# Patient Record
Sex: MALE
[De-identification: names, ages, dates, MRNs, and addresses within clinical notes are randomized per-mention and may not be internally consistent; named-entity substitution may affect disease eponyms.]

---

## 2018-05-24 ENCOUNTER — HOSPITAL ENCOUNTER (EMERGENCY)
Dept: HOSPITAL 97 - ER | Age: 68
Discharge: HOME | End: 2018-05-24
Payer: COMMERCIAL

## 2018-05-24 VITALS — DIASTOLIC BLOOD PRESSURE: 77 MMHG | SYSTOLIC BLOOD PRESSURE: 181 MMHG

## 2018-05-24 VITALS — TEMPERATURE: 97.6 F | OXYGEN SATURATION: 100 %
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
## 2018-05-24 DIAGNOSIS — L03.011: ICD-10-CM
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
## 2018-05-24 DIAGNOSIS — S61.431A: Primary | ICD-10-CM

## 2018-05-24 PROCEDURE — 90714 TD VACC NO PRESV 7 YRS+ IM: CPT

## 2018-05-24 PROCEDURE — 0HCFXZZ EXTIRPATION OF MATTER FROM RIGHT HAND SKIN, EXTERNAL APPROACH: ICD-10-PCS

## 2018-05-24 PROCEDURE — 99284 EMERGENCY DEPT VISIT MOD MDM: CPT

## 2018-05-24 NOTE — ER
Nurse's Notes                                                                                     

 [facility]                                                                

Name: Juliet Dangelo                                                                         

Age: 67 yrs                                                                                       

Sex: Male                                                                                         

: 1950                                                                                   

MRN: [id number]                                                                                   

Arrival Date: 2018                                                                          

Time: 13:14                                                                                       

Account#: [id number]                                                                            

Bed Treatment                                                                                     

Private MD: Clay León E                                                                     

Diagnosis: Puncture wound without foreign body of right hand-splinter to right hand               

  rremoved;Cellulitis of right finger-thumb                                                       

                                                                                                  

Presentation:                                                                                     

                                                                                             

13:18 Presenting complaint: Patient states: Infection in right thumb for 2 days. Patient      aj  

      reports splinter removed yesterday, pain and swelling persists. Transition of care:         

      patient was not received from another setting of care. Onset of symptoms was May 23,        

      2018. Care prior to arrival: None.                                                          

13:18 Method Of Arrival: Ambulatory                                                           aj  

13:18 Acuity: TEQUILA 4                                                                           aj  

16:01 Risk Assessment: Do you want to hurt yourself or someone else? Patient reports no       sv  

      desire to harm self or others. Initial Sepsis Screen: Does the patient meet any 2           

      criteria? No. Patient's initial sepsis screen is negative. Does the patient have a          

      suspected source of infection? Yes: Skin breakdown/wound.                                   

                                                                                                  

Triage Assessment:                                                                                

13:19 General: Appears in no apparent distress. comfortable, Behavior is calm, cooperative,   aj  

      appropriate for age. Pain: Complains of pain in dorsal aspect of distal phalanx of          

      right thumb and palmar aspect of distal phalanx of right thumb. Neuro: Level of             

      Consciousness is awake, alert, obeys commands, Oriented to person, place, time,             

      situation, Appropriate for age. Respiratory: Airway is patent Respiratory effort is         

      even, unlabored, Respiratory pattern is regular, symmetrical. Derm: Skin is intact, is      

      healthy with good turgor, Skin is pink, warm \T\ dry. normal. Musculoskeletal: Swelling     

      present in dorsal aspect of distal phalanx of right thumb and palmar aspect of distal       

      phalanx of right thumb.                                                                     

                                                                                                  

Historical:                                                                                       

- Allergies:                                                                                      

13:19 No Known Allergies;                                                                     aj  

- Home Meds:                                                                                      

13:19 aspirin 81 mg Oral TbEC 1 tab once daily [Active]; atorvastatin 20 mg Oral tab 1 tab    aj  

      once daily [Active]; baclofen 10 mg Oral tab 1 tab 3 times per day [Active]; lisinopril     

      20 mg Oral tab 1 tab once daily [Active]; metformin 500 mg Oral tab 1 tab 2 times per       

      day [Active];                                                                               

- PMHx:                                                                                           

13:19 CVA; Diabetes - NIDDM; Hyperlipidemia; Hypertension;                                    aj  

- PSHx:                                                                                           

13:19 Cholecystectomy;                                                                        aj  

                                                                                                  

- Immunization history:: Last tetanus immunization: < 5 years ago.                                

- Social history:: Smoking status: Patient/guardian denies using tobacco.                         

- Ebola Screening: : No symptoms or risks identified at this time.                                

                                                                                                  

                                                                                                  

Screenin:00 Abuse screen: Denies threats or abuse. Denies injuries from another. Nutritional        sv  

      screening: No deficits noted. Tuberculosis screening: No symptoms or risk factors           

      identified. Fall Risk None identified.                                                      

                                                                                                  

Assessment:                                                                                       

15:56 General: Appears in no apparent distress. uncomfortable, slender, Behavior is calm,     sv  

      cooperative, appropriate for age. Pain: Complains of pain in right thumb Pain currently     

      is 7 out of 10 on a pain scale. Quality of pain is described as tender, throbbing, Pain     

      began 2-3 days ago. Is continuous. Neuro: Level of Consciousness is awake, alert, obeys     

      commands, Oriented to person, place, time, situation, Moves all extremities. Full           

      function Gait is steady. Cardiovascular: Patient's skin is warm and dry. Pulses are 3+      

      in right radial artery and left radial artery. Respiratory: Respiratory effort is even,     

      unlabored, Respiratory pattern is regular, symmetrical. Derm: Skin is normal.               

      Musculoskeletal: Range of motion: intact in all extremities, Swelling present in right      

      thumb Pt reports that he had a splinter lodged in his skin and underneath the nail for      

      about a day then took it out and the swelling started. Pt went to Dr Stroud's office but      

      was then referred here for further evaluation.                                              

16:44 Reassessment: Patient appears in no apparent distress at this time. No changes from     sv  

      previously documented assessment. Patient and/or family updated on plan of care and         

      expected duration. Pain level reassessed. Patient is alert, oriented x 3, equal             

      unlabored respirations, skin warm/dry/pink.                                                 

18:52 Reassessment: Patient appears in no apparent distress at this time. Patient and/or      sv  

      family updated on plan of care and expected duration. Pain level reassessed. Patient is     

      alert, oriented x 3, equal unlabored respirations, skin warm/dry/pink. Patient denies       

      pain at this time.                                                                          

                                                                                                  

Vital Signs:                                                                                      

13:19  / 79; Pulse 68; Resp 19; Temp 97.6; Pulse Ox 100% on R/A; Weight 68.04 kg;       aj  

      Height 5 ft. 4 in. (162.56 cm);                                                             

16:44  / 77; Pulse 57; Resp 18; Pulse Ox 100% ;                                         sv  

13:19 Body Mass Index 25.75 (68.04 kg, 162.56 cm)                                             aj  

                                                                                                  

ED Course:                                                                                        

13:14 Patient arrived in ED.                                                                  mr  

13:15 Clay León MD is Private Physician.                                                mr  

13:19 Triage completed.                                                                       aj  

13:19 Arm band placed on left wrist. Patient placed in waiting room, Patient notified of wait aj  

      time.                                                                                       

15:56 Rae Babin, RN is Primary Nurse.                                                  sv  

16:00 Patient has correct armband on for positive identification. Adult w/ patient. Door      sv  

      closed.                                                                                     

16:12 Noble Barba NP is PHCP.                                                           pm1 

16:13 Jose Khalil MD is Attending Physician.                                              pm1 

16:32 X-ray(s) taken.                                                                         sv  

16:44 X-ray completed. Portable x-ray completed in exam room. Patient tolerated procedure     bb2 

      well.                                                                                       

16:49 Hand Right 3 View XRAY In Process Unspecified.                                          EDMS

18:26 Tavo Winston MD is Referral Physician.                                              pm1 

18:30 Assist provider with I \T\ D: of an abscess on right thumb Set up I\T\D tray. Performed by  cecilia Barba NP Dressing with Neosporin and bandaid.                                     

18:52 Patient did not have IV access during this emergency room visit.                        sv  

                                                                                                  

Administered Medications:                                                                         

16:30 Drug: Tetanus-Diphtheria Toxoid Adult 0.5 ml {: UpTo. Exp:         sv  

      2020. Lot #: [id number]. } Route: IM; Site: left deltoid;                                  

17:35 Follow up: Response: No adverse reaction                                                sv  

17:35 Drug: Lidocaine (1 %) 5 ml {Note: given to Noble MCKINNON for procedure.} Volume: 5 ml;     sv  

      Route: Infiltration;                                                                        

                                                                                                  

                                                                                                  

Outcome:                                                                                          

18:27 Discharge ordered by MD.                                                                pm1 

18:52 Discharged to home ambulatory, with family.                                             sv  

18:52 Condition: stable                                                                           

18:52 Discharge instructions given to patient, family, Instructed on discharge instructions,      

      follow up and referral plans. medication usage, wound care, Demonstrated understanding      

      of instructions, follow-up care, medications, wound care, Prescriptions given X 2.          

18:53 Patient left the ED.                                                                    sv  

                                                                                                  

Signatures:                                                                                       

Dispatcher [employer]                           EDRae Torres RN RN sv Myers, Amanda, RN RN aj Rivera, Maria mr Marinas, Patrick, NP                    NP   pm1                                                  

Roe, Nat                               bb2                                                  

                                                                                                  

**************************************************************************************************

## 2018-05-24 NOTE — EDPHYS
Physician Documentation                                                                           

 [facility]                                                                

Name: Juliet Dangelo                                                                         

Age: 67 yrs                                                                                       

Sex: Male                                                                                         

: 1950                                                                                   

MRN: [id number]                                                                                   

Arrival Date: 2018                                                                          

Time: 13:14                                                                                       

Account#: [id number]                                                                            

Bed Treatment                                                                                     

Private MD: Clay León E                                                                     

ED Physician Jose Khalil                                                                       

HPI:                                                                                              

                                                                                             

18:00 This 67 yrs old  Male presents to ER via Ambulatory with complaints of Thumb    pm1 

      Injury.                                                                                     

18:00 The patient or guardian reports pain, swelling. The complaints affect the right thumb.  pm1 

      Context: The problem was sustained at home, resulted from puncture wound 2 days ago.        

      Onset: The symptoms/episode began/occurred 2 day(s) ago. Modifying factors: The             

      symptoms are alleviated by nothing, the symptoms are aggravated by nothing. Associated      

      signs and symptoms: Pertinent negatives: numbness distally, tingling distally. Severity     

      of symptoms: in the emergency department the symptoms are actually worse. The patient       

      has not experienced similar symptoms in the past. The patient has not recently seen a       

      physician. patient was changing a curtain chong and got a splinter to his right thumb 2       

      days ago. Patient removed the splinter yesterday. patient without fever. Full range of      

      motion present to thumb.                                                                    

                                                                                                  

Historical:                                                                                       

- Allergies:                                                                                      

13:19 No Known Allergies;                                                                     aj  

- Home Meds:                                                                                      

13:19 aspirin 81 mg Oral TbEC 1 tab once daily [Active]; atorvastatin 20 mg Oral tab 1 tab    aj  

      once daily [Active]; baclofen 10 mg Oral tab 1 tab 3 times per day [Active]; lisinopril     

      20 mg Oral tab 1 tab once daily [Active]; metformin 500 mg Oral tab 1 tab 2 times per       

      day [Active];                                                                               

- PMHx:                                                                                           

13:19 CVA; Diabetes - NIDDM; Hyperlipidemia; Hypertension;                                    aj  

- PSHx:                                                                                           

13:19 Cholecystectomy;                                                                        aj  

                                                                                                  

- Immunization history:: Last tetanus immunization: < 5 years ago.                                

- Social history:: Smoking status: Patient/guardian denies using tobacco.                         

- Ebola Screening: : No symptoms or risks identified at this time.                                

                                                                                                  

                                                                                                  

ROS:                                                                                              

18:00 Constitutional: Negative for fever, chills, and weight loss, Eyes: Negative for injury, pm1 

      pain, redness, and discharge, ENT: Negative for injury, pain, and discharge, Neck:          

      Negative for injury, pain, and swelling, Cardiovascular: Negative for chest pain,           

      palpitations, and edema, Respiratory: Negative for shortness of breath, cough,              

      wheezing, and pleuritic chest pain, Abdomen/GI: Negative for abdominal pain, nausea,        

      vomiting, diarrhea, and constipation, Back: Negative for injury and pain.                   

18:00 Skin: Negative for injury, rash, and discoloration, Neuro: Negative for headache,           

      weakness, numbness, tingling, and seizure.                                                  

18:00 MS/extremity: Positive for pain, swelling, of the right thumb, Negative for decreased       

      range of motion, deformity.                                                                 

                                                                                                  

Exam:                                                                                             

18:00 Constitutional:  This is a well developed, well nourished patient who is awake, alert,  pm1 

      and in no acute distress. Head/Face:  Normocephalic, atraumatic. Neck:  Trachea             

      midline, no thyromegaly or masses palpated, and no cervical lymphadenopathy.  Supple,       

      full range of motion without nuchal rigidity, or vertebral point tenderness.  No            

      Meningismus. Chest/axilla:  Normal chest wall appearance and motion.  Nontender with no     

      deformity.  No lesions are appreciated. Cardiovascular:  Regular rate and rhythm with a     

      normal S1 and S2.  No gallops, murmurs, or rubs.  Normal PMI, no JVD.  No pulse             

      deficits. Respiratory:  Lungs have equal breath sounds bilaterally, clear to                

      auscultation and percussion.  No rales, rhonchi or wheezes noted.  No increased work of     

      breathing, no retractions or nasal flaring. Back:  No spinal tenderness.  No                

      costovertebral tenderness.  Full range of motion.                                           

18:00 MS/ Extremity:  Pulses equal, no cyanosis.  Neurovascular intact.  Full, normal range       

      of motion.  No fusiform swelling, tenderness over tendon sheath, flexed position of         

      finger, or pain with passive flexion of right thumb.  Negative kanavel's signs              

18:00 Skin: cellulitis, that is minimal, on the  dorsal aspect of distal phalanx of right         

      thumb.                                                                                      

                                                                                                  

Vital Signs:                                                                                      

13:19  / 79; Pulse 68; Resp 19; Temp 97.6; Pulse Ox 100% on R/A; Weight 68.04 kg;       aj  

      Height 5 ft. 4 in. (162.56 cm);                                                             

16:44  / 77; Pulse 57; Resp 18; Pulse Ox 100% ;                                         sv  

13:19 Body Mass Index 25.75 (68.04 kg, 162.56 cm)                                               

                                                                                                  

Procedures:                                                                                       

18:00 I \T\ D: Incision and drainage was performed for an abscess of the right Prepped with     pm1

      Betadine, Anesthetized with 2 ml's 1% Lidocaine. digital block to right thumb. Incised      

      with Drained No purulence or drainage present with incision with scalpel #11 to main        

      area of swelling, lateral aspect of right thumb nail, and lifting cuticle. No abscess       

      present the patient tolerated the procedure well.                                           

                                                                                                  

MDM:                                                                                              

16:13 Patient medically screened.                                                             pm1 

17:37 Data reviewed: vital signs. Data interpreted: Pulse oximetry: on room air is 100 %.     pm1 

      Interpretation: normal.                                                                     

18:25 Counseling: I had a detailed discussion with the patient and/or guardian regarding: the pm1 

      historical points, exam findings, and any diagnostic results supporting the                 

      discharge/admit diagnosis, radiology results, the need for outpatient follow up, a hand     

      specialist, to return to the emergency department if symptoms worsen or persist or if       

      there are any questions or concerns that arise at home.                                     

                                                                                                  

                                                                                             

16:16 Order name: Hand Right 3 View XRAY; Complete Time: 17:17                                pm1 

                                                                                                  

Administered Medications:                                                                         

16:30 Drug: Tetanus-Diphtheria Toxoid Adult 0.5 ml {: [email or web address]. Exp:         sv  

      2020. Lot #: [id number]. } Route: IM; Site: left deltoid;                                  

17:35 Follow up: Response: No adverse reaction                                                sv  

17:35 Drug: Lidocaine (1 %) 5 ml {Note: given to Noble MCKINNON for procedure.} Volume: 5 ml;     sv  

      Route: Infiltration;                                                                        

                                                                                                  

                                                                                                  

Disposition:                                                                                      

19:21 Co-signature as Attending Physician, Jose Khalil MD I agree with the assessment and   kdr 

      plan of care.                                                                               

                                                                                                  

Disposition:                                                                                      

18 18:27 Discharged to Home. Impression: Puncture wound without foreign body of right       

  hand - splinter to right hand rremoved, Cellulitis of right finger - thumb.                     

- Condition is Stable.                                                                            

- Discharge Instructions: Puncture Wound.                                                         

- Prescriptions for Bactrim - 160 mg Oral Tablet - take 1 tablet by ORAL route              

  every 12 hours for 10 days; 20 tablet. Keflex 500 mg Oral Capsule - take 1 capsule by           

  ORAL route every 12 hours for 10 days; 20 capsule.                                              

- Medication Reconciliation Form, Thank You Letter, Antibiotic Education form.                    

- Follow up: Tavo Winston MD; When: 2 - 3 days; Reason: Recheck today's complaints,           

  Continuance of care, Re-evaluation by your physician.                                           

- Problem is new.                                                                                 

- Symptoms have improved.                                                                         

                                                                                                  

                                                                                                  

                                                                                                  

Signatures:                                                                                       

Dispatcher [employer]                           EDRae Torres RN RN sv Myers, Amanda, RN RN aj Rittger, Kevin, MD MD kdr Marinas, Patrick, NP                    NP   pm1                                                  

                                                                                                  

Corrections: (The following items were deleted from the chart)                                    

18:29 18:27 2018 18:27 Discharged to Home. Impression: Puncture wound without foreign   pm1 

      body of right hand - splinter to right hand rremoved. Condition is Stable. Forms are        

      Medication Reconciliation Form, Thank You Letter, Antibiotic Education, Prescription        

      Opioid Use. Follow up: Tavo Winston; When: 2 - 3 days; Reason: Recheck today's            

      complaints, Continuance of care, Re-evaluation by your physician. Problem is new.           

      Symptoms have improved. pm1                                                                 

18:53 18:29 2018 18:27 Discharged to Home. Impression: Puncture wound without foreign   sv  

      body of right hand - splinter to right hand rremoved; Cellulitis of right finger -          

      thumb. Condition is Stable. Discharge Instructions: Puncture Wound. Prescriptions for       

      Bactrim -160 mg Oral Tablet - take 1 tablet by ORAL route every 12 hours for 10       

      days; 20 tablet, Keflex 500 mg Oral Capsule - take 1 capsule by ORAL route every 12         

      hours for 10 days; 20 capsule. and Forms are Medication Reconciliation Form, Thank You      

      Letter, Antibiotic Education. Follow up: Tavo Winston; When: 2 - 3 days; Reason:          

      Recheck today's complaints, Continuance of care, Re-evaluation by your physician.           

      Problem is new. Symptoms have improved. pm1                                                 

                                                                                                  

**************************************************************************************************

## 2018-05-24 NOTE — RAD REPORT
EXAM DESCRIPTION:  RAD - Hand Right 3 View - 5/24/2018 4:52 pm

 

CLINICAL HISTORY:  First digit infection.

 

COMPARISON:  None.

 

FINDINGS:  Moderate soft tissue swelling is present involving the first digit. No fracture, dislocati
on or evidence of subcutaneous air. Radiocarpal arthritic changes are present. Vascular calcification
s are seen.

 

IMPRESSION:  Moderate soft tissue swelling of the first digit.

## 2019-04-06 ENCOUNTER — HOSPITAL ENCOUNTER (EMERGENCY)
Dept: HOSPITAL 97 - ER | Age: 69
Discharge: HOME | End: 2019-04-06
Payer: COMMERCIAL

## 2019-04-06 VITALS — TEMPERATURE: 100.3 F

## 2019-04-06 VITALS — OXYGEN SATURATION: 99 % | DIASTOLIC BLOOD PRESSURE: 82 MMHG | SYSTOLIC BLOOD PRESSURE: 143 MMHG

## 2019-04-06 DIAGNOSIS — Z79.82: ICD-10-CM

## 2019-04-06 DIAGNOSIS — I10: ICD-10-CM

## 2019-04-06 DIAGNOSIS — E78.5: ICD-10-CM

## 2019-04-06 DIAGNOSIS — E11.9: ICD-10-CM

## 2019-04-06 DIAGNOSIS — R50.9: ICD-10-CM

## 2019-04-06 DIAGNOSIS — R09.02: ICD-10-CM

## 2019-04-06 DIAGNOSIS — D72.829: ICD-10-CM

## 2019-04-06 DIAGNOSIS — J15.8: Primary | ICD-10-CM

## 2019-04-06 LAB
ALBUMIN SERPL BCP-MCNC: 4.2 G/DL (ref 3.4–5)
ALP SERPL-CCNC: 110 U/L (ref 45–117)
ALT SERPL W P-5'-P-CCNC: 22 U/L (ref 12–78)
AST SERPL W P-5'-P-CCNC: 23 U/L (ref 15–37)
BUN BLD-MCNC: 14 MG/DL (ref 7–18)
BURR CELLS BLD QL SMEAR: (no result)
CKMB CREATINE KINASE MB: 1.4 NG/ML (ref 0.3–3.6)
GLUCOSE SERPLBLD-MCNC: 182 MG/DL (ref 74–106)
HCT VFR BLD CALC: 37.7 % (ref 39.6–49)
INR BLD: 1.08
LIPASE SERPL-CCNC: 95 U/L (ref 73–393)
LYMPHOCYTES # SPEC AUTO: 1 K/UL (ref 0.7–4.9)
MORPHOLOGY BLD-IMP: (no result)
PMV BLD: 8.5 FL (ref 7.6–11.3)
POTASSIUM SERPL-SCNC: 4 MMOL/L (ref 3.5–5.1)
RBC # BLD: 4.1 M/UL (ref 4.33–5.43)
TROPONIN (EMERG DEPT USE ONLY): < 0.02 NG/ML (ref 0–0.04)

## 2019-04-06 PROCEDURE — 82550 ASSAY OF CK (CPK): CPT

## 2019-04-06 PROCEDURE — 96366 THER/PROPH/DIAG IV INF ADDON: CPT

## 2019-04-06 PROCEDURE — 96368 THER/DIAG CONCURRENT INF: CPT

## 2019-04-06 PROCEDURE — 80076 HEPATIC FUNCTION PANEL: CPT

## 2019-04-06 PROCEDURE — 84484 ASSAY OF TROPONIN QUANT: CPT

## 2019-04-06 PROCEDURE — 36415 COLL VENOUS BLD VENIPUNCTURE: CPT

## 2019-04-06 PROCEDURE — 85025 COMPLETE CBC W/AUTO DIFF WBC: CPT

## 2019-04-06 PROCEDURE — 99285 EMERGENCY DEPT VISIT HI MDM: CPT

## 2019-04-06 PROCEDURE — 83605 ASSAY OF LACTIC ACID: CPT

## 2019-04-06 PROCEDURE — 84145 PROCALCITONIN (PCT): CPT

## 2019-04-06 PROCEDURE — 87804 INFLUENZA ASSAY W/OPTIC: CPT

## 2019-04-06 PROCEDURE — 80048 BASIC METABOLIC PNL TOTAL CA: CPT

## 2019-04-06 PROCEDURE — 85610 PROTHROMBIN TIME: CPT

## 2019-04-06 PROCEDURE — 85730 THROMBOPLASTIN TIME PARTIAL: CPT

## 2019-04-06 PROCEDURE — 93005 ELECTROCARDIOGRAM TRACING: CPT

## 2019-04-06 PROCEDURE — 82553 CREATINE MB FRACTION: CPT

## 2019-04-06 PROCEDURE — 87040 BLOOD CULTURE FOR BACTERIA: CPT

## 2019-04-06 PROCEDURE — 96375 TX/PRO/DX INJ NEW DRUG ADDON: CPT

## 2019-04-06 PROCEDURE — 71275 CT ANGIOGRAPHY CHEST: CPT

## 2019-04-06 PROCEDURE — 83690 ASSAY OF LIPASE: CPT

## 2019-04-06 PROCEDURE — 96365 THER/PROPH/DIAG IV INF INIT: CPT

## 2019-04-06 PROCEDURE — 71045 X-RAY EXAM CHEST 1 VIEW: CPT

## 2019-04-06 NOTE — RAD REPORT
EXAM DESCRIPTION:  RAD - Chest Single View - 4/6/2019 8:42 am

 

CLINICAL HISTORY:  Cough

 

COMPARISON:  July 2017

 

TECHNIQUE:  AP portable chest image was obtained 0835 hour .

 

FINDINGS:  Lung volumes are low. No acute lung parenchymal process. Lung markings are similar to comp
arison. Heart and vasculature are normal. No measurable pleural effusion and no pneumothorax. No acut
e bony abnormality seen. No acute aortic findings suspected.

 

IMPRESSION:  No acute cardiopulmonary process.

 

 No suspicious interval change.

## 2019-04-06 NOTE — XMS REPORT
Patient Summary Document

 Created on:2019



Patient:BRENDA MAYNARD

Sex:Male

:1950

External Reference #:[id number]





Demographics







 Address  [address] [place]



   [address] 



   [place] [address]

 

 Home Phone  [phone]

 

 Work Phone  [phone]

 

 Email Address  [email or web address]

 

 Preferred Language  Unknown

 

 Marital Status  Unknown

 

 Muslim Affiliation  Unknown

 

 Race  Unknown

 

 Additional Race(s)  Unavailable

 

 Ethnic Group  Unknown









Author







 Organization  [facility]

 

 Address  [address] [place] [address] [address]



   [place] [address]

 

 Phone  [phone]









Care Team Providers







 Name  Role  Phone

 

 Unavailable  Unavailable  Unavailable









Problems

This patient has no known problems.



Allergies, Adverse Reactions, Alerts

This patient has no known allergies or adverse reactions.



Medications

This patient has no known medications.

## 2019-04-06 NOTE — EDPHYS
Physician Documentation                                                                           

 [facility]                                                                 

Name: Juliet Dangelo                                                                         

Age: 68 yrs                                                                                       

Sex: Male                                                                                         

: 1950                                                                                   

MRN: [id number]                                                                                   

Arrival Date: 2019                                                                          

Time: 08:00                                                                                       

Account#: [id number]                                                                            

Bed 13                                                                                            

Private MD:                                                                                       

ED Physician Allan Lama                                                                      

HPI:                                                                                              

                                                                                             

08:31 This 68 yrs old  Male presents to ER via Ambulatory with complaints of Cough.   coral 

08:31 The patient or guardian reports airway noise, cough, difficulty breathing. Onset: The   coral 

      symptoms/episode began/occurred 3 day(s) ago. Severity of symptoms: At their worst the      

      symptoms were mild, in the emergency department the symptoms are unchanged. Modifying       

      factors: The symptoms are alleviated by nothing. Associated signs and symptoms:             

      Pertinent positives: fever. The patient has experienced similar episodes in the past, a     

      few times.                                                                                  

                                                                                                  

Historical:                                                                                       

- Allergies:                                                                                      

08:13 No Known Allergies;                                                                     hb  

- Home Meds:                                                                                      

08:13 aspirin 81 mg Oral TbEC 1 tab once daily [Active]; atorvastatin 20 mg Oral tab 1 tab    hb  

      once daily [Active]; baclofen 10 mg Oral tab 1 tab 3 times per day [Active]; lisinopril     

      20 mg Oral tab 1 tab once daily [Active]; metformin 500 mg Oral tab 1 tab 2 times per       

      day [Active];                                                                               

- PMHx:                                                                                           

08:13 CVA; Diabetes - NIDDM; Hyperlipidemia; Hypertension;                                    hb  

- PSHx:                                                                                           

08:13 Cholecystectomy;                                                                        hb  

                                                                                                  

- Immunization history:: Adult Immunizations up to date.                                          

- Social history:: Smoking status: Patient/guardian denies using tobacco.                         

- Ebola Screening: : No symptoms or risks identified at this time.                                

                                                                                                  

                                                                                                  

ROS:                                                                                              

08:35 Constitutional: Negative for fever, chills, and weight loss, Eyes: Negative for injury, coral 

      pain, redness, and discharge, Neck: Negative for injury, pain, and swelling,                

      Cardiovascular: Negative for chest pain, palpitations, and edema, Abdomen/GI: Negative      

      for abdominal pain, nausea, vomiting, diarrhea, and constipation, Back: Negative for        

      injury and pain, : Negative for injury, bleeding, discharge, and swelling,                

      MS/Extremity: Negative for injury and deformity, Skin: Negative for injury, rash, and       

      discoloration, Neuro: Negative for headache, weakness, numbness, tingling, and seizure,     

      Psych: Negative for depression, anxiety, suicide ideation, homicidal ideation, and          

      hallucinations, Allergy/Immunology: Negative for hives, rash, and allergies, Endocrine:     

      Negative for neck swelling, polydipsia, polyuria, polyphagia, and marked weight             

      changes, Hematologic/Lymphatic: Negative for swollen nodes, abnormal bleeding, and          

      unusual bruising.                                                                           

08:35 ENT: Positive for hoarseness, rhinorrhea.                                                   

08:35 Respiratory: Positive for cough, shortness of breath, at rest.                              

                                                                                                  

Exam:                                                                                             

08:35 Constitutional:  This is a well developed, well nourished patient who is awake, alert,  coral 

      and in no acute distress. Head/Face:  Normocephalic, atraumatic. Eyes:  Pupils equal        

      round and reactive to light, extra-ocular motions intact.  Lids and lashes normal.          

      Conjunctiva and sclera are non-icteric and not injected.  Cornea within normal limits.      

      Periorbital areas with no swelling, redness, or edema. ENT:  Nares patent. No nasal         

      discharge, no septal abnormalities noted.  Tympanic membranes are normal and external       

      auditory canals are clear.  Oropharynx with no redness, swelling, or masses, exudates,      

      or evidence of obstruction, uvula midline.  Mucous membranes moist. Neck:  Trachea          

      midline, no thyromegaly or masses palpated, and no cervical lymphadenopathy.  Supple,       

      full range of motion without nuchal rigidity, or vertebral point tenderness.  No            

      Meningismus. Chest/axilla:  Normal chest wall appearance and motion.  Nontender with no     

      deformity.  No lesions are appreciated. Abdomen/GI:  Soft, non-tender, with normal          

      bowel sounds.  No distension or tympany.  No guarding or rebound.  No evidence of           

      tenderness throughout. Back:  No spinal tenderness.  No costovertebral tenderness.          

      Full range of motion. Male :  Normal genitalia with no discharge or lesions. Skin:        

      Warm, dry with normal turgor.  Normal color with no rashes, no lesions, and no evidence     

      of cellulitis. MS/ Extremity:  Pulses equal, no cyanosis.  Neurovascular intact.  Full,     

      normal range of motion. Neuro:  Awake and alert, GCS 15, oriented to person, place,         

      time, and situation.  Cranial nerves II-XII grossly intact.  Motor strength 5/5 in all      

      extremities.  Sensory grossly intact.  Cerebellar exam normal.  Normal gait. Psych:         

      Awake, alert, with orientation to person, place and time.  Behavior, mood, and affect       

      are within normal limits.                                                                   

08:35 Cardiovascular: Rate: tachycardic, Rhythm: regular, Pulses: Pulses are 4+ in bilateral      

      radial, brachial, femoral, popliteal, posterior tibial and and dorsalis pedis               

      arteries.. Heart sounds: normal, Edema: is not appreciated, JVD: is not appreciated.        

                                                                                                  

Vital Signs:                                                                                      

08:11  / 88; Pulse 118; Resp 20; Temp 100.3; Pulse Ox 90% on R/A; Pain 4/10;            hb  

08:27 Weight 68.04 kg (R);                                                                    em  

09:05  / 83; Pulse 119; Resp 18; Pulse Ox 100% on Nebulizer Mask;                       em  

10:20  / 75; Pulse 102; Resp 16; Pulse Ox 96% on R/A;                                   em  

11:32  / 82; Pulse 94; Resp 18; Pulse Ox 99% on R/A;                                    em  

                                                                                                  

MDM:                                                                                              

08:10 Patient medically screened.                                                             [facility] 

08:41 Data reviewed: vital signs, nurses notes, lab test result(s), EKG, radiologic studies,  coral 

      plain films.                                                                                

                                                                                                  

                                                                                             

08:24 Order name: Basic Metabolic Panel; Complete Time: 09:23                                 em  

                                                                                             

08:24 Order name: Blood Culture Adult (2)                                                     em  

                                                                                             

08:24 Order name: CBC with Diff; Complete Time: 10:25                                         em  

                                                                                             

08:24 Order name: Ckmb; Complete Time: 09:23                                                  em  

                                                                                             

08:24 Order name: CPK; Complete Time: 09:23                                                   em  

                                                                                             

08:24 Order name: Lactate; Complete Time: 09:23                                               em  

                                                                                             

08:24 Order name: LFT's; Complete Time: 09:23                                                 em  

                                                                                             

08:24 Order name: Lipase; Complete Time: 09:23                                                em  

                                                                                             

08:24 Order name: Procalcitonin; Complete Time: 10:25                                         em  

                                                                                             

08:24 Order name: Protime (+inr); Complete Time: 09:23                                        em  

                                                                                             

08:24 Order name: Ptt, Activated; Complete Time: 09:23                                        em  

                                                                                             

08:24 Order name: Troponin (emerg Dept Use Only); Complete Time: 09:23                        em  

                                                                                             

08:27 Order name: Flu; Complete Time: 09:23                                                   [facility] 

                                                                                             

08:24 Order name: Chest Single View XRAY; Complete Time: 10:25                                em  

                                                                                             

08:24 Order name: Accucheck; Complete Time: 08:39                                             em  

                                                                                             

08:24 Order name: Cardiac monitoring; Complete Time: 08:39                                    em  

                                                                                             

08:24 Order name: EKG - Nurse/Tech; Complete Time: 08:39                                      em  

                                                                                             

08:24 Order name: IV Saline Lock - Large Bore; Complete Time: 08:39                           em  

                                                                                             

08:24 Order name: Labs collected and sent; Complete Time: 08:39                               em  

                                                                                             

08:49 Order name: Manual Differential; Complete Time: 10:25                                   EDMS

                                                                                             

09:25 Order name: CT Chest For PE Angio; Complete Time: 10:25                                 coral 

                                                                                             

10:30 Order name: INCENTIVE SPIROMETRY                                                        coral 

                                                                                             

08:24 Order name: O2 Per Protocol; Complete Time: 08:39                                       em  

                                                                                             

08:24 Order name: O2 Sat Monitoring; Complete Time: 08:39                                     em  

                                                                                                  

Administered Medications:                                                                         

08:45 Drug: Tylenol 650 mg Route: PO;                                                         em  

11:30 Follow up: Response: No adverse reaction                                                em  

08:50 Drug: Xopenex 2.5 mg Route: Inhalation;                                                 em  

09:30 Follow up: Response: No adverse reaction; Marked relief of symptoms                     em  

08:50 Drug: AtroVENT Aerosol 0.5 mg Route: Inhalation;                                        em  

09:30 Follow up: Response: No adverse reaction; Marked relief of symptoms                     em  

08:55 Drug: NS 0.9% (30 ml/kg) 30 ml/kg Route: IV; Rate: bolus; Site: right forearm;          em  

08:56 Drug: Rocephin 2 grams Route: IV; Rate: per protocol; Site: right forearm;              la1 

09:30 Follow up: Response: No adverse reaction; IV Status: Completed infusion; IV Intake: 20mlem  

09:22 Drug: Zithromax 500 mg Route: IVPB; Infused Over: 1 hrs; Site: right forearm;           em  

11:30 Follow up: Response: No adverse reaction; IV Status: Completed infusion; IV Intake:     em  

      250ml                                                                                       

                                                                                                  

                                                                                                  

Disposition:                                                                                      

19 10:29 Discharged to Home. Impression: Cough, Pneumonia due to other specified            

  bacteria - left base , Type 2 diabetes mellitus, Fever, unspecified,                            

  Hypoxemia, Elevated white blood cell count.                                                     

- Condition is Stable.                                                                            

- Discharge Instructions: Type 2 Diabetes Mellitus, Diagnosis, Adult,                             

  Community-Acquired Pneumonia, Adult, Community-Acquired Pneumonia, Adult,                       

  Easy-to-Read, Hypoxemia, Cough, Adult, Type 2 Diabetes Mellitus, Diagnosis, Adult,              

  Easy-to-Read, Fever, Adult, Easy-to-Read.                                                       

- Prescriptions for Albuterol Sulfate 90 mcg/actuation - inhale 1-2 puff by INHALATION            

  route every 4-6 hours; 1 Inhaler. Zithromax 500 mg Oral Tablet - take 1 tablet by               

  ORAL route once daily for 5 days; 5 tablet. Cheratussin AC 10- 100 mg/5 mL Oral                 

  liquid - take 10 milliliter by ORAL route every 4 hours; 150 milliliter.                        

- Medication Reconciliation Form, Thank You Letter, Antibiotic Education, Prescription            

  Opioid Use form.                                                                                

- Follow up: Private Physician; When: 2 - 3 days; Reason: Recheck today's complaints,             

  Continuance of care, Re-evaluation by your physician. Follow up: Benjamin Lock MD;             

  When: 2 - 3 days; Reason: Recheck today's complaints, Continuance of care,                      

  Re-evaluation by your physician.                                                                

- Problem is new.                                                                                 

- Symptoms have improved.                                                                         

                                                                                                  

                                                                                                  

                                                                                                  

Signatures:                                                                                       

Dispatcher [employer]                           [facility]                                                 

Allan Lama MD MD cha Munoz, Edgar, LVN                       LVN  em                                                   

Terry Vargas RN                         RN   laMarifer Bonilla, RN                     RN                                                      

                                                                                                  

Corrections: (The following items were deleted from the chart)                                    

09:39 09:24 Thorax Wo Con+[id number].BRZ ordered. [facility]

10:31 10:29 2019 10:29 Discharged to Home. Impression: Cough; Pneumonia due to other    coral 

      specified bacteria - left base . Condition is Stable. Forms are Medication                  

      Reconciliation Form, Thank You Letter, Antibiotic Education, Prescription Opioid Use.       

      Follow up: Private Physician; When: 2 - 3 days; Reason: Recheck today's complaints,         

      Continuance of care, Re-evaluation by your physician. Follow up: Benjamin Lock; When: 2      

      - 3 days; Reason: Recheck today's complaints, Continuance of care, Re-evaluation by         

      your physician. Problem is new. Symptoms have improved. coral                                 

11:35 10:31 2019 10:29 Discharged to Home. Impression: Cough; Pneumonia due to other    em  

      specified bacteria - left base ; Type 2 diabetes mellitus; Fever, unspecified;              

      Hypoxemia; Elevated white blood cell count. Condition is Stable. Discharge                  

      Instructions: Community-Acquired Pneumonia, Adult, Community-Acquired Pneumonia, Adult,     

      Easy-to-Read, Cough, Adult. Prescriptions for Albuterol Sulfate 90 mcg/actuation -          

      inhale 1-2 puff by INHALATION route every 4-6 hours; 1 Inhaler, Zithromax 500 mg Oral       

      Tablet - take 1 tablet by ORAL route once daily for 5 days; 5 tablet. and Forms are         

      Medication Reconciliation Form, Thank You Letter, Antibiotic Education, Prescription        

      Opioid Use. Follow up: Private Physician; When: 2 - 3 days; Reason: Recheck today's         

      complaints, Continuance of care, Re-evaluation by your physician. Follow up: Benjamin Lock; When: 2 - 3 days; Reason: Recheck today's complaints, Continuance of care,           

      Re-evaluation by your physician. Problem is new. Symptoms have improved. coral                

                                                                                                  

**************************************************************************************************

## 2019-04-06 NOTE — ER
Nurse's Notes                                                                                     

 [facility]                                                                 

Name: Juliet Dangelo                                                                         

Age: 68 yrs                                                                                       

Sex: Male                                                                                         

: 1950                                                                                   

MRN: [id number]                                                                                   

Arrival Date: 2019                                                                          

Time: 08:00                                                                                       

Account#: [id number]                                                                            

Bed 13                                                                                            

Private MD:                                                                                       

Diagnosis: Cough;Pneumonia due to other specified bacteria-left base ;Type 2 diabetes             

  mellitus;Fever, unspecified;Hypoxemia;Elevated white blood cell count                           

                                                                                                  

Presentation:                                                                                     

                                                                                             

08:11 Presenting complaint: Patient states: Nonproductive cough, pain with cough, headache,   hb  

      and sore throat x 3 days. Transition of care: patient was not received from another         

      setting of care. Onset of symptoms was 2019. Risk Assessment: Do you want to      

      hurt yourself or someone else? Patient reports no desire to harm self or others. Care       

      prior to arrival: None.                                                                     

08:11 Method Of Arrival: Ambulatory                                                           hb  

08:11 Acuity: TEQUILA 2                                                                           hb  

08:20 Initial Sepsis Screen: Does the patient meet any 2 criteria? HR > 90 bpm. No. Patient's em  

      initial sepsis screen is negative. Does the patient have a suspected source of              

      infection? Yes: Productive cough/pneumonia.                                                 

                                                                                                  

Historical:                                                                                       

- Allergies:                                                                                      

08:13 No Known Allergies;                                                                     hb  

- Home Meds:                                                                                      

08:13 aspirin 81 mg Oral TbEC 1 tab once daily [Active]; atorvastatin 20 mg Oral tab 1 tab    hb  

      once daily [Active]; baclofen 10 mg Oral tab 1 tab 3 times per day [Active]; lisinopril     

      20 mg Oral tab 1 tab once daily [Active]; metformin 500 mg Oral tab 1 tab 2 times per       

      day [Active];                                                                               

- PMHx:                                                                                           

08:13 CVA; Diabetes - NIDDM; Hyperlipidemia; Hypertension;                                    hb  

- PSHx:                                                                                           

08:13 Cholecystectomy;                                                                        hb  

                                                                                                  

- Immunization history:: Adult Immunizations up to date.                                          

- Social history:: Smoking status: Patient/guardian denies using tobacco.                         

- Ebola Screening: : No symptoms or risks identified at this time.                                

                                                                                                  

                                                                                                  

Screenin:13 Abuse screen: Denies threats or abuse. Denies injuries from another. Nutritional        hb  

      screening: No deficits noted. Tuberculosis screening: No symptoms or risk factors           

      identified. Fall Risk None identified.                                                      

                                                                                                  

Assessment:                                                                                       

08:35 General: Appears in no apparent distress. comfortable, Behavior is calm, cooperative,   em  

      Denies fever. Pain: Complains of pain in chest Pain currently is 4 out of 10 on a pain      

      scale. Pain began 2-3 days ago. Neuro: Level of Consciousness is awake, alert, obeys        

      commands, Oriented to person, place, time, situation. Cardiovascular: Capillary refill      

      < 3 seconds Patient's skin is warm and dry. Respiratory: Reports cough that is              

      productive, pain with cough since 3 days ago Airway is patent Respiratory effort is         

      even, unlabored, Respiratory pattern is regular, symmetrical, Breath sounds are             

      diminished bilaterally. GI: Abdomen is flat. : No signs and/or symptoms were reported     

      regarding the genitourinary system. EENT: Denies nasal congestion, difficulty               

      swallowing. Derm: Skin is intact, is healthy with good turgor, Skin is pink, warm \T\       

      dry. Musculoskeletal: Capillary refill < 3 seconds, Range of motion: intact in all          

      extremities.                                                                                

08:45 Reassessment: I agree with previous assessment.                                         hb  

09:10 Reassessment: Patient appears in no apparent distress at this time. Patient and/or      em  

      family updated on plan of care and expected duration. Pain level reassessed. Patient is     

      alert, oriented x 3, equal unlabored respirations, skin warm/dry/pink.                      

10:30 Reassessment: Patient appears in no apparent distress at this time. Patient and/or      em  

      family updated on plan of care and expected duration. Pain level reassessed. Patient is     

      alert, oriented x 3, equal unlabored respirations, skin warm/dry/pink. Patient states       

      feeling better. Patient states symptoms have improved.                                      

11:34 Reassessment: Patient appears in no apparent distress at this time. Patient and/or      em  

      family updated on plan of care and expected duration. Pain level reassessed. Patient is     

      alert, oriented x 3, equal unlabored respirations, skin warm/dry/pink.                      

                                                                                                  

Vital Signs:                                                                                      

08:11  / 88; Pulse 118; Resp 20; Temp 100.3; Pulse Ox 90% on R/A; Pain 4/10;            hb  

08:27 Weight 68.04 kg (R);                                                                    em  

09:05  / 83; Pulse 119; Resp 18; Pulse Ox 100% on Nebulizer Mask;                       em  

10:20  / 75; Pulse 102; Resp 16; Pulse Ox 96% on R/A;                                   em  

11:32  / 82; Pulse 94; Resp 18; Pulse Ox 99% on R/A;                                    em  

                                                                                                  

ED Course:                                                                                        

08:00 Patient arrived in ED.                                                                  as  

08:09 Allan Lama MD is Attending Physician.                                             coral 

08:12 Triage completed.                                                                       hb  

08:12 Arm band placed on.                                                                     hb  

08:15 Cade Joseph LVN is Primary Nurse.                                                     em  

08:35 Patient has correct armband on for positive identification. Placed in gown. Bed in low  em  

      position. Call light in reach. Side rails up X2. Adult w/ patient. Cardiac monitor on.      

      Pulse ox on. NIBP on.                                                                       

08:35 Initial lab(s) drawn, by me, sent to lab. Flu and/or RSV swab sent to lab. Inserted     em  

      saline lock: 20 gauge in right forearm, using aseptic technique. Blood collected.           

08:43 Chest Single View XRAY In Process Unspecified.                                          EDMS

08:45 EKG done, by ED staff, reviewed by Allan Lama MD.                                   em1 

08:50 Initial Neb Treatment Given as ordered.                                                 em  

10:05 CT Chest For PE Angio In Process Unspecified.                                           EDMS

10:05 CT completed. Patient tolerated procedure well. Patient moved back from CT.             mw3 

10:28 Benjamin Lock MD is Referral Physician.                                                coral 

11:34 No provider procedures requiring assistance completed. IV discontinued, intact,         em  

      bleeding controlled, No redness/swelling at site. Pressure dressing applied.                

                                                                                                  

Administered Medications:                                                                         

08:45 Drug: Tylenol 650 mg Route: PO;                                                         em  

11:30 Follow up: Response: No adverse reaction                                                em  

08:50 Drug: Xopenex 2.5 mg Route: Inhalation;                                                 em  

09:30 Follow up: Response: No adverse reaction; Marked relief of symptoms                     em  

08:50 Drug: AtroVENT Aerosol 0.5 mg Route: Inhalation;                                        em  

09:30 Follow up: Response: No adverse reaction; Marked relief of symptoms                     em  

08:55 Drug: NS 0.9% (30 ml/kg) 30 ml/kg Route: IV; Rate: bolus; Site: right forearm;          em  

08:56 Drug: Rocephin 2 grams Route: IV; Rate: per protocol; Site: right forearm;              la1 

09:30 Follow up: Response: No adverse reaction; IV Status: Completed infusion; IV Intake: 20mlem  

09:22 Drug: Zithromax 500 mg Route: IVPB; Infused Over: 1 hrs; Site: right forearm;           em  

11:30 Follow up: Response: No adverse reaction; IV Status: Completed infusion; IV Intake:     em  

      250ml                                                                                       

                                                                                                  

                                                                                                  

Intake:                                                                                           

09:30 IV: 20ml; Total: 20ml.                                                                  em  

11:30 IV: 250ml; Total: 270ml.                                                                em  

                                                                                                  

Outcome:                                                                                          

10:29 Discharge ordered by MD.                                                                [facility] 

11:34 Discharged to home via wheelchair, with family.                                         em  

11:34 Condition: good                                                                             

11:34 Discharge instructions given to patient, family, Instructed on discharge instructions,      

      follow up and referral plans. medication usage, Demonstrated understanding of               

      instructions, follow-up care, medications, Prescriptions given X 3.                         

11:35 Patient left the ED.                                                                    em  

                                                                                                  

Signatures:                                                                                       

Dispatcher [employer]                           EDAllan Martino MD MD cha Munoz, Edgar, LVN                       LVN  Michelle Murdock Eric                               em1                                                  

Terry Vargas RN                         RN   la1                                                  

Marifer Patel RN                     RN   Ada Trujillo                             3                                                  

                                                                                                  

**************************************************************************************************

## 2019-04-06 NOTE — RAD REPORT
EXAM DESCRIPTION:  CT - Chest For Pe Angio - 4/6/2019 10:05 am

 

CLINICAL HISTORY:   Cough, fever, dyspnea

 

COMPARISON:  Portable chest same day

 

TECHNIQUE:  Dynamically enhanced 3 mm thick images of the chest were obtained during administration o
f approximately 150mL Isovue 370 IV contrast. Coronal and oblique MIP reconstruction images were gene
rated and reviewed. Exam utilizes a protocol to evaluate the pulmonary arterial tree.

 

All CT scans are performed using dose optimization technique as appropriate and may include automated
 exposure control or mA/KV adjustment according to patient size.

 

FINDINGS:  No pulmonary emboli are identified.

 

The aorta as imaged shows no acute or suspicious finding. No pericardial thickening or effusion. Hear
t size is upper normal. There is motion degradation present. Left ventricular wall thickness appears 
increased. Assessment is limited given the amount of motion.

 

Minimal patchy alveolar opacities are present in the posterior gutter on the left. This would be obsc
ured or occult on a portable chest film. No pleural effusion or pleural thickening.

 

No mediastinal or hilar suspicious masses. No chest wall masses or abnormal axillary lymphadenopathy.


 

IMPRESSION:  No pulmonary emboli identified.

 

Minimal patchy alveolar opacities in the posterior gutter on the left. Minimal left base pneumonia wo
uld be suspected and can be correlated with clinical presentation.

 

Motion degradation limits heart assessment. There are findings suggesting left ventricular muscular h
ypertrophy.

## 2019-04-09 NOTE — EKG
Test Date:    2019-04-06               Test Time:    08:41:05

Technician:   MONIQUE                                    

                                                     

MEASUREMENT RESULTS:                                       

Intervals:                                           

Rate:         114                                    

KY:           200                                    

QRSD:         76                                     

QT:           312                                    

QTc:          430                                    

Axis:                                                

P:            64                                     

KY:           200                                    

QRS:          -41                                    

T:            49                                     

                                                     

INTERPRETIVE STATEMENTS:                                       

                                                     

Sinus tachycardia

Left axis deviation

Pulmonary disease pattern

Abnormal ECG

Compared to ECG 06/06/2009 14:16:10

Left-axis deviation now present

Sinus rhythm no longer present



Electronically Signed On 04-07-19 10:51:44 CDT by Lowell Katz